# Patient Record
Sex: MALE | Race: WHITE | NOT HISPANIC OR LATINO | Employment: OTHER | URBAN - METROPOLITAN AREA
[De-identification: names, ages, dates, MRNs, and addresses within clinical notes are randomized per-mention and may not be internally consistent; named-entity substitution may affect disease eponyms.]

---

## 2017-07-25 ENCOUNTER — TELEPHONE (OUTPATIENT)
Dept: TRANSPLANT | Facility: CLINIC | Age: 72
End: 2017-07-25

## 2017-07-25 NOTE — TELEPHONE ENCOUNTER
Patient filled out an online form regarding information for lung transplant.  Called patient and informed him that he would not be a candidate for lung transplant at [facility] due to his age (72) and that he should contact [facility] to see if he might be a candidate there.  Pt verbalized understanding.